# Patient Record
Sex: FEMALE | Race: WHITE | Employment: UNEMPLOYED | URBAN - METROPOLITAN AREA
[De-identification: names, ages, dates, MRNs, and addresses within clinical notes are randomized per-mention and may not be internally consistent; named-entity substitution may affect disease eponyms.]

---

## 2024-05-30 ENCOUNTER — HOSPITAL ENCOUNTER (OUTPATIENT)
Facility: HOSPITAL | Age: 24
Setting detail: OBSERVATION
Discharge: HOME | End: 2024-05-31
Attending: EMERGENCY MEDICINE | Admitting: INTERNAL MEDICINE
Payer: COMMERCIAL

## 2024-05-30 DIAGNOSIS — E11.65 HYPERGLYCEMIA DUE TO DIABETES MELLITUS (HCC): ICD-10-CM

## 2024-05-30 DIAGNOSIS — N18.4 ANEMIA DUE TO STAGE 4 CHRONIC KIDNEY DISEASE  (HCC): ICD-10-CM

## 2024-05-30 DIAGNOSIS — E87.20 METABOLIC ACIDOSIS: ICD-10-CM

## 2024-05-30 DIAGNOSIS — N18.9 ACUTE RENAL FAILURE SUPERIMPOSED ON CHRONIC KIDNEY DISEASE, UNSPECIFIED ACUTE RENAL FAILURE TYPE, UNSPECIFIED CKD STAGE  (HCC): ICD-10-CM

## 2024-05-30 DIAGNOSIS — N17.9 ACUTE RENAL FAILURE SUPERIMPOSED ON CHRONIC KIDNEY DISEASE, UNSPECIFIED ACUTE RENAL FAILURE TYPE, UNSPECIFIED CKD STAGE  (HCC): ICD-10-CM

## 2024-05-30 DIAGNOSIS — N25.81 SECONDARY HYPERPARATHYROIDISM OF RENAL ORIGIN (HCC): ICD-10-CM

## 2024-05-30 DIAGNOSIS — E10.65 HYPERGLYCEMIA DUE TO TYPE 1 DIABETES MELLITUS (HCC): Primary | ICD-10-CM

## 2024-05-30 DIAGNOSIS — D63.1 ANEMIA DUE TO STAGE 4 CHRONIC KIDNEY DISEASE  (HCC): ICD-10-CM

## 2024-05-30 DIAGNOSIS — N18.4 STAGE 4 CHRONIC KIDNEY DISEASE (HCC): ICD-10-CM

## 2024-05-30 PROBLEM — I10 HYPERTENSION: Status: ACTIVE | Noted: 2024-05-30

## 2024-05-30 LAB
ALBUMIN SERPL BCP-MCNC: 4.1 G/DL (ref 3.5–5)
ALBUMIN SERPL BCP-MCNC: 4.1 G/DL (ref 3.5–5)
ALP SERPL-CCNC: 143 U/L (ref 34–104)
ALP SERPL-CCNC: 143 U/L (ref 34–104)
ALT SERPL W P-5'-P-CCNC: 16 U/L (ref 7–52)
ALT SERPL W P-5'-P-CCNC: 16 U/L (ref 7–52)
ANION GAP SERPL CALCULATED.3IONS-SCNC: 10 MMOL/L (ref 4–13)
ANION GAP SERPL CALCULATED.3IONS-SCNC: 10 MMOL/L (ref 4–13)
AST SERPL W P-5'-P-CCNC: 16 U/L (ref 13–39)
AST SERPL W P-5'-P-CCNC: 16 U/L (ref 13–39)
ATRIAL RATE: 84 BPM
ATRIAL RATE: 84 BPM
B-OH-BUTYR SERPL-MCNC: <0.05 MMOL/L (ref 0.02–0.27)
B-OH-BUTYR SERPL-MCNC: <0.05 MMOL/L (ref 0.02–0.27)
BACTERIA UR QL AUTO: NORMAL /HPF
BACTERIA UR QL AUTO: NORMAL /HPF
BASE EX.OXY STD BLDV CALC-SCNC: 69.2 % (ref 60–80)
BASE EX.OXY STD BLDV CALC-SCNC: 69.2 % (ref 60–80)
BASE EXCESS BLDV CALC-SCNC: -8.1 MMOL/L
BASE EXCESS BLDV CALC-SCNC: -8.1 MMOL/L
BASOPHILS # BLD AUTO: 0.06 THOUSANDS/ÂΜL (ref 0–0.1)
BASOPHILS # BLD AUTO: 0.06 THOUSANDS/ÂΜL (ref 0–0.1)
BASOPHILS NFR BLD AUTO: 1 % (ref 0–1)
BASOPHILS NFR BLD AUTO: 1 % (ref 0–1)
BILIRUB SERPL-MCNC: 0.24 MG/DL (ref 0.2–1)
BILIRUB SERPL-MCNC: 0.24 MG/DL (ref 0.2–1)
BILIRUB UR QL STRIP: NEGATIVE
BILIRUB UR QL STRIP: NEGATIVE
BUN SERPL-MCNC: 59 MG/DL (ref 5–25)
BUN SERPL-MCNC: 59 MG/DL (ref 5–25)
CALCIUM SERPL-MCNC: 9.2 MG/DL (ref 8.4–10.2)
CALCIUM SERPL-MCNC: 9.2 MG/DL (ref 8.4–10.2)
CHLORIDE SERPL-SCNC: 101 MMOL/L (ref 96–108)
CHLORIDE SERPL-SCNC: 101 MMOL/L (ref 96–108)
CLARITY UR: CLEAR
CLARITY UR: CLEAR
CO2 SERPL-SCNC: 18 MMOL/L (ref 21–32)
CO2 SERPL-SCNC: 18 MMOL/L (ref 21–32)
COLOR UR: COLORLESS
COLOR UR: COLORLESS
CREAT SERPL-MCNC: 4.61 MG/DL (ref 0.6–1.3)
CREAT SERPL-MCNC: 4.61 MG/DL (ref 0.6–1.3)
EOSINOPHIL # BLD AUTO: 0.23 THOUSAND/ÂΜL (ref 0–0.61)
EOSINOPHIL # BLD AUTO: 0.23 THOUSAND/ÂΜL (ref 0–0.61)
EOSINOPHIL NFR BLD AUTO: 3 % (ref 0–6)
EOSINOPHIL NFR BLD AUTO: 3 % (ref 0–6)
ERYTHROCYTE [DISTWIDTH] IN BLOOD BY AUTOMATED COUNT: 12.1 % (ref 11.6–15.1)
ERYTHROCYTE [DISTWIDTH] IN BLOOD BY AUTOMATED COUNT: 12.1 % (ref 11.6–15.1)
EXT PREGNANCY TEST URINE: NEGATIVE
EXT PREGNANCY TEST URINE: NEGATIVE
EXT. CONTROL: NORMAL
EXT. CONTROL: NORMAL
GFR SERPL CREATININE-BSD FRML MDRD: 12 ML/MIN/1.73SQ M
GFR SERPL CREATININE-BSD FRML MDRD: 12 ML/MIN/1.73SQ M
GLUCOSE SERPL-MCNC: 291 MG/DL (ref 65–140)
GLUCOSE SERPL-MCNC: 291 MG/DL (ref 65–140)
GLUCOSE SERPL-MCNC: 343 MG/DL (ref 65–140)
GLUCOSE SERPL-MCNC: 343 MG/DL (ref 65–140)
GLUCOSE SERPL-MCNC: 355 MG/DL (ref 65–140)
GLUCOSE SERPL-MCNC: 355 MG/DL (ref 65–140)
GLUCOSE SERPL-MCNC: 384 MG/DL (ref 65–140)
GLUCOSE SERPL-MCNC: 384 MG/DL (ref 65–140)
GLUCOSE SERPL-MCNC: 448 MG/DL (ref 65–140)
GLUCOSE SERPL-MCNC: 448 MG/DL (ref 65–140)
GLUCOSE SERPL-MCNC: 464 MG/DL (ref 65–140)
GLUCOSE SERPL-MCNC: 464 MG/DL (ref 65–140)
GLUCOSE UR STRIP-MCNC: ABNORMAL MG/DL
GLUCOSE UR STRIP-MCNC: ABNORMAL MG/DL
HCO3 BLDV-SCNC: 17.3 MMOL/L (ref 24–30)
HCO3 BLDV-SCNC: 17.3 MMOL/L (ref 24–30)
HCT VFR BLD AUTO: 30.1 % (ref 34.8–46.1)
HCT VFR BLD AUTO: 30.1 % (ref 34.8–46.1)
HGB BLD-MCNC: 10.6 G/DL (ref 11.5–15.4)
HGB BLD-MCNC: 10.6 G/DL (ref 11.5–15.4)
HGB UR QL STRIP.AUTO: ABNORMAL
HGB UR QL STRIP.AUTO: ABNORMAL
IMM GRANULOCYTES # BLD AUTO: 0.04 THOUSAND/UL (ref 0–0.2)
IMM GRANULOCYTES # BLD AUTO: 0.04 THOUSAND/UL (ref 0–0.2)
IMM GRANULOCYTES NFR BLD AUTO: 1 % (ref 0–2)
IMM GRANULOCYTES NFR BLD AUTO: 1 % (ref 0–2)
KETONES UR STRIP-MCNC: NEGATIVE MG/DL
KETONES UR STRIP-MCNC: NEGATIVE MG/DL
LEUKOCYTE ESTERASE UR QL STRIP: ABNORMAL
LEUKOCYTE ESTERASE UR QL STRIP: ABNORMAL
LYMPHOCYTES # BLD AUTO: 1.74 THOUSANDS/ÂΜL (ref 0.6–4.47)
LYMPHOCYTES # BLD AUTO: 1.74 THOUSANDS/ÂΜL (ref 0.6–4.47)
LYMPHOCYTES NFR BLD AUTO: 21 % (ref 14–44)
LYMPHOCYTES NFR BLD AUTO: 21 % (ref 14–44)
MCH RBC QN AUTO: 29.7 PG (ref 26.8–34.3)
MCH RBC QN AUTO: 29.7 PG (ref 26.8–34.3)
MCHC RBC AUTO-ENTMCNC: 35.2 G/DL (ref 31.4–37.4)
MCHC RBC AUTO-ENTMCNC: 35.2 G/DL (ref 31.4–37.4)
MCV RBC AUTO: 84 FL (ref 82–98)
MCV RBC AUTO: 84 FL (ref 82–98)
MONOCYTES # BLD AUTO: 0.46 THOUSAND/ÂΜL (ref 0.17–1.22)
MONOCYTES # BLD AUTO: 0.46 THOUSAND/ÂΜL (ref 0.17–1.22)
MONOCYTES NFR BLD AUTO: 6 % (ref 4–12)
MONOCYTES NFR BLD AUTO: 6 % (ref 4–12)
NEUTROPHILS # BLD AUTO: 5.86 THOUSANDS/ÂΜL (ref 1.85–7.62)
NEUTROPHILS # BLD AUTO: 5.86 THOUSANDS/ÂΜL (ref 1.85–7.62)
NEUTS SEG NFR BLD AUTO: 68 % (ref 43–75)
NEUTS SEG NFR BLD AUTO: 68 % (ref 43–75)
NITRITE UR QL STRIP: NEGATIVE
NITRITE UR QL STRIP: NEGATIVE
NON-SQ EPI CELLS URNS QL MICRO: NORMAL /HPF
NON-SQ EPI CELLS URNS QL MICRO: NORMAL /HPF
NRBC BLD AUTO-RTO: 0 /100 WBCS
NRBC BLD AUTO-RTO: 0 /100 WBCS
O2 CT BLDV-SCNC: 10.8 ML/DL
O2 CT BLDV-SCNC: 10.8 ML/DL
P AXIS: 70 DEGREES
P AXIS: 70 DEGREES
PCO2 BLDV: 35 MM HG (ref 42–50)
PCO2 BLDV: 35 MM HG (ref 42–50)
PH BLDV: 7.31 [PH] (ref 7.3–7.4)
PH BLDV: 7.31 [PH] (ref 7.3–7.4)
PH UR STRIP.AUTO: 6 [PH]
PH UR STRIP.AUTO: 6 [PH]
PLATELET # BLD AUTO: 225 THOUSANDS/UL (ref 149–390)
PLATELET # BLD AUTO: 225 THOUSANDS/UL (ref 149–390)
PMV BLD AUTO: 11.1 FL (ref 8.9–12.7)
PMV BLD AUTO: 11.1 FL (ref 8.9–12.7)
PO2 BLDV: 34.5 MM HG (ref 35–45)
PO2 BLDV: 34.5 MM HG (ref 35–45)
POTASSIUM SERPL-SCNC: 4.1 MMOL/L (ref 3.5–5.3)
POTASSIUM SERPL-SCNC: 4.1 MMOL/L (ref 3.5–5.3)
PR INTERVAL: 142 MS
PR INTERVAL: 142 MS
PROT SERPL-MCNC: 8 G/DL (ref 6.4–8.4)
PROT SERPL-MCNC: 8 G/DL (ref 6.4–8.4)
PROT UR STRIP-MCNC: ABNORMAL MG/DL
PROT UR STRIP-MCNC: ABNORMAL MG/DL
QRS AXIS: 71 DEGREES
QRS AXIS: 71 DEGREES
QRSD INTERVAL: 70 MS
QRSD INTERVAL: 70 MS
QT INTERVAL: 370 MS
QT INTERVAL: 370 MS
QTC INTERVAL: 437 MS
QTC INTERVAL: 437 MS
RBC # BLD AUTO: 3.57 MILLION/UL (ref 3.81–5.12)
RBC # BLD AUTO: 3.57 MILLION/UL (ref 3.81–5.12)
RBC #/AREA URNS AUTO: NORMAL /HPF
RBC #/AREA URNS AUTO: NORMAL /HPF
SODIUM SERPL-SCNC: 129 MMOL/L (ref 135–147)
SODIUM SERPL-SCNC: 129 MMOL/L (ref 135–147)
SP GR UR STRIP.AUTO: 1.01 (ref 1–1.03)
SP GR UR STRIP.AUTO: 1.01 (ref 1–1.03)
T WAVE AXIS: 67 DEGREES
T WAVE AXIS: 67 DEGREES
UROBILINOGEN UR STRIP-ACNC: <2 MG/DL
UROBILINOGEN UR STRIP-ACNC: <2 MG/DL
VENTRICULAR RATE: 84 BPM
VENTRICULAR RATE: 84 BPM
WBC # BLD AUTO: 8.39 THOUSAND/UL (ref 4.31–10.16)
WBC # BLD AUTO: 8.39 THOUSAND/UL (ref 4.31–10.16)
WBC #/AREA URNS AUTO: NORMAL /HPF
WBC #/AREA URNS AUTO: NORMAL /HPF

## 2024-05-30 PROCEDURE — 82010 KETONE BODYS QUAN: CPT

## 2024-05-30 PROCEDURE — 82948 REAGENT STRIP/BLOOD GLUCOSE: CPT

## 2024-05-30 PROCEDURE — 93005 ELECTROCARDIOGRAM TRACING: CPT

## 2024-05-30 PROCEDURE — 36415 COLL VENOUS BLD VENIPUNCTURE: CPT

## 2024-05-30 PROCEDURE — 96365 THER/PROPH/DIAG IV INF INIT: CPT

## 2024-05-30 PROCEDURE — 85025 COMPLETE CBC W/AUTO DIFF WBC: CPT | Performed by: EMERGENCY MEDICINE

## 2024-05-30 PROCEDURE — 99223 1ST HOSP IP/OBS HIGH 75: CPT | Performed by: INTERNAL MEDICINE

## 2024-05-30 PROCEDURE — 81001 URINALYSIS AUTO W/SCOPE: CPT | Performed by: EMERGENCY MEDICINE

## 2024-05-30 PROCEDURE — 93010 ELECTROCARDIOGRAM REPORT: CPT | Performed by: INTERNAL MEDICINE

## 2024-05-30 PROCEDURE — 82805 BLOOD GASES W/O2 SATURATION: CPT | Performed by: EMERGENCY MEDICINE

## 2024-05-30 PROCEDURE — 80053 COMPREHEN METABOLIC PANEL: CPT | Performed by: EMERGENCY MEDICINE

## 2024-05-30 PROCEDURE — 99284 EMERGENCY DEPT VISIT MOD MDM: CPT

## 2024-05-30 PROCEDURE — 81025 URINE PREGNANCY TEST: CPT | Performed by: EMERGENCY MEDICINE

## 2024-05-30 PROCEDURE — 99285 EMERGENCY DEPT VISIT HI MDM: CPT | Performed by: EMERGENCY MEDICINE

## 2024-05-30 RX ORDER — CABERGOLINE 0.5 MG/1
0.25 TABLET ORAL 2 TIMES WEEKLY
COMMUNITY
End: 2024-05-30 | Stop reason: CLARIF

## 2024-05-30 RX ORDER — FERROUS GLUCONATE 324(38)MG
324 TABLET ORAL
Status: DISCONTINUED | OUTPATIENT
Start: 2024-05-31 | End: 2024-05-31 | Stop reason: HOSPADM

## 2024-05-30 RX ORDER — HEPARIN SODIUM 5000 [USP'U]/ML
5000 INJECTION, SOLUTION INTRAVENOUS; SUBCUTANEOUS EVERY 8 HOURS SCHEDULED
Status: DISCONTINUED | OUTPATIENT
Start: 2024-05-30 | End: 2024-05-31 | Stop reason: HOSPADM

## 2024-05-30 RX ORDER — AMLODIPINE BESYLATE 5 MG/1
5 TABLET ORAL DAILY
COMMUNITY

## 2024-05-30 RX ORDER — SODIUM BICARBONATE 650 MG/1
650 TABLET ORAL 2 TIMES DAILY
Status: DISCONTINUED | OUTPATIENT
Start: 2024-05-30 | End: 2024-05-31 | Stop reason: HOSPADM

## 2024-05-30 RX ORDER — INSULIN LISPRO 100 [IU]/ML
4 INJECTION, SOLUTION INTRAVENOUS; SUBCUTANEOUS
Status: DISCONTINUED | OUTPATIENT
Start: 2024-05-30 | End: 2024-05-31 | Stop reason: HOSPADM

## 2024-05-30 RX ORDER — SODIUM CHLORIDE, SODIUM GLUCONATE, SODIUM ACETATE, POTASSIUM CHLORIDE, MAGNESIUM CHLORIDE, SODIUM PHOSPHATE, DIBASIC, AND POTASSIUM PHOSPHATE .53; .5; .37; .037; .03; .012; .00082 G/100ML; G/100ML; G/100ML; G/100ML; G/100ML; G/100ML; G/100ML
100 INJECTION, SOLUTION INTRAVENOUS CONTINUOUS
Status: DISPENSED | OUTPATIENT
Start: 2024-05-30 | End: 2024-05-31

## 2024-05-30 RX ORDER — ACETAMINOPHEN 325 MG/1
975 TABLET ORAL EVERY 8 HOURS PRN
Status: DISCONTINUED | OUTPATIENT
Start: 2024-05-30 | End: 2024-05-31 | Stop reason: HOSPADM

## 2024-05-30 RX ORDER — AMLODIPINE BESYLATE 5 MG/1
5 TABLET ORAL DAILY
Status: DISCONTINUED | OUTPATIENT
Start: 2024-05-31 | End: 2024-05-31 | Stop reason: HOSPADM

## 2024-05-30 RX ORDER — CALCITRIOL 0.25 UG/1
0.25 CAPSULE, LIQUID FILLED ORAL 3 TIMES WEEKLY
Status: DISCONTINUED | OUTPATIENT
Start: 2024-05-31 | End: 2024-05-31 | Stop reason: HOSPADM

## 2024-05-30 RX ORDER — SODIUM BICARBONATE 650 MG/1
650 TABLET ORAL 2 TIMES DAILY
COMMUNITY

## 2024-05-30 RX ORDER — FERROUS GLUCONATE 324(38)MG
324 TABLET ORAL
COMMUNITY

## 2024-05-30 RX ORDER — INSULIN LISPRO 100 [IU]/ML
1-5 INJECTION, SOLUTION INTRAVENOUS; SUBCUTANEOUS
Status: DISCONTINUED | OUTPATIENT
Start: 2024-05-30 | End: 2024-05-31 | Stop reason: HOSPADM

## 2024-05-30 RX ORDER — SODIUM CHLORIDE, SODIUM GLUCONATE, SODIUM ACETATE, POTASSIUM CHLORIDE, MAGNESIUM CHLORIDE, SODIUM PHOSPHATE, DIBASIC, AND POTASSIUM PHOSPHATE .53; .5; .37; .037; .03; .012; .00082 G/100ML; G/100ML; G/100ML; G/100ML; G/100ML; G/100ML; G/100ML
1000 INJECTION, SOLUTION INTRAVENOUS ONCE
Status: COMPLETED | OUTPATIENT
Start: 2024-05-30 | End: 2024-05-30

## 2024-05-30 RX ADMIN — INSULIN LISPRO 4 UNITS: 100 INJECTION, SOLUTION INTRAVENOUS; SUBCUTANEOUS at 21:11

## 2024-05-30 RX ADMIN — SODIUM CHLORIDE, SODIUM GLUCONATE, SODIUM ACETATE, POTASSIUM CHLORIDE, MAGNESIUM CHLORIDE, SODIUM PHOSPHATE, DIBASIC, AND POTASSIUM PHOSPHATE 1000 ML: .53; .5; .37; .037; .03; .012; .00082 INJECTION, SOLUTION INTRAVENOUS at 16:13

## 2024-05-30 RX ADMIN — SODIUM CHLORIDE, SODIUM GLUCONATE, SODIUM ACETATE, POTASSIUM CHLORIDE, MAGNESIUM CHLORIDE, SODIUM PHOSPHATE, DIBASIC, AND POTASSIUM PHOSPHATE 100 ML/HR: .53; .5; .37; .037; .03; .012; .00082 INJECTION, SOLUTION INTRAVENOUS at 20:01

## 2024-05-30 RX ADMIN — INSULIN LISPRO 5 UNITS: 100 INJECTION, SOLUTION INTRAVENOUS; SUBCUTANEOUS at 22:29

## 2024-05-30 RX ADMIN — ACETAMINOPHEN 975 MG: 325 TABLET ORAL at 22:27

## 2024-05-30 RX ADMIN — HEPARIN SODIUM 5000 UNITS: 5000 INJECTION INTRAVENOUS; SUBCUTANEOUS at 22:31

## 2024-05-30 RX ADMIN — SODIUM BICARBONATE 650 MG: 650 TABLET ORAL at 20:01

## 2024-05-30 RX ADMIN — INSULIN HUMAN 8 UNITS: 100 INJECTION, SUSPENSION SUBCUTANEOUS at 22:28

## 2024-05-30 NOTE — ASSESSMENT & PLAN NOTE
Patient presents with fatigue, polyuria, and polydipsia  Patient reports compliance with home insulin regimen, however sugars are consistently in the 300s-400s at home  Last HbA1c 9.3 in April 2024  Glucose 576 on outpatient blood work yesterday, patient's nephrologist instructed patient to come the ER  Glucose 343 on admission  Corrected sodium WNL  Follows outpatient with endocrinologist Dr. Peck, as seen in April   S/p 1L bolus in the ED  Home insulin regimen: NPH insulin 4 units daily in the morning, regular insulin TID with meals, NPH insulin 6 units qhs    Plan:  NPH insulin: 6 units in the morning, 8 units qhs  Insulin lispro: 4 units TID with meals  Accuchecks 4x daily before meals and at bedtime  Follow up Beta hydroxybutyrate   IVF Isolyte 100cc/hr for 12 hours

## 2024-05-30 NOTE — ASSESSMENT & PLAN NOTE
Lab Results   Component Value Date    EGFR 12 05/30/2024    CREATININE 4.61 (H) 05/30/2024     Cr 3.9-4.6 in April 2024  Follows outpatient with nephrologist      Plan:  Monitor BMP  Nephrology consulted, appreciate recommendations

## 2024-05-30 NOTE — H&P
[facility]  H&P  Name: Ashok Song 23 y.o. female I MRN: [id number]  Unit/Bed#: W -01 I Date of Admission: 5/30/2024   Date of Service: 5/30/2024 I Hospital Day: 0      Assessment & Plan   * Hyperglycemia due to type 1 diabetes mellitus (HCC)  Assessment & Plan  Patient presents with fatigue, polyuria, and polydipsia  Patient reports compliance with home insulin regimen, however sugars are consistently in the 300s-400s at home  Last HbA1c 9.3 in April 2024  Glucose 576 on outpatient blood work yesterday, patient's nephrologist instructed patient to come the ER  Glucose 343 on admission  Corrected sodium WNL  Follows outpatient with endocrinologist Dr. Peck, as seen in April   S/p 1L bolus in the ED  Home insulin regimen: NPH insulin 4 units daily in the morning, regular insulin TID with meals, NPH insulin 6 units qhs    Plan:  NPH insulin: 6 units in the morning, 8 units qhs  Insulin lispro: 4 units TID with meals  Accuchecks 4x daily before meals and at bedtime  Follow up Beta hydroxybutyrate   IVF Isolyte 100cc/hr for 12 hours     Stage 4 chronic kidney disease (HCC)  Assessment & Plan  Lab Results   Component Value Date    EGFR 12 05/30/2024    CREATININE 4.61 (H) 05/30/2024     Cr 3.6-4.6 in April 2024  Follows outpatient with nephrologist      Plan:  Monitor BMP  Nephrology consulted, appreciate recommendations     Hypertension  Assessment & Plan  BP well-controlled  Most Recent Blood Pressure: 136/78    Plan:  Continue home Amlodipine 5 mg daily     Metabolic acidosis  Assessment & Plan  No anion gap  Possibly RTA in the setting of worsening renal function  On home sodium bicarb 650 mg BID    Plan:  Continue sodium bicarb 650 mg BID  Nephrology consulted, appreciate recommendations       Secondary hyperparathyroidism of renal origin (HCC) with Vitamin D deficiency  Assessment & Plan  Last  in April 2024  Vitamin D 25-hydroxy 21.5 on 5/29  Per nephrology  note, patient was to take Vitamin D 50,000 units weekly for 12 weeks followed by 5,000 units daily  Per patient, she has completed the 50,000 weekly dosing  Her nephrologist ordered Calcitriol 0.25mg 3x weekly, to be started outpatient on 5/31    Plan:  Calcitriol 0.25mg 3x weekly  Vitamin D 5,000 units daily  Nephrology consulted, appreciate recommendations     Anemia due to stage 4 chronic kidney disease  (HCC)  Assessment & Plan  Recent Labs     05/30/24  1541   HGB 10.6*   MCV 84     Plan:  Continue home ferrous gluconate 324 mg daily   Monitor CBC  Nephrology consulted, appreciate recommendations            VTE Pharmacologic Prophylaxis: VTE Score: 3 Moderate Risk (Score 3-4) - Pharmacological DVT Prophylaxis Ordered: heparin.  Code Status: Level 1 - Full Code   Discussion with family: Updated  (sister) at bedside.    Anticipated Length of Stay: Patient will be admitted on an observation basis with an anticipated length of stay of less than 2 midnights secondary to hyperglycemia.    Chief Complaint: Hyperglycemia     History of Present Illness:  History was obtained using [employer] French Interpretor [id number]    Ashok Song is a 23 y.o. female with a PMH of Type 1 diabetes, CKD IV, and hypertension who presents with persistent hyperglycemia at home, associated with fatigue, polyuria, and polydipsia. She says that sugars are always in the 300s-400s at home, despite compliance with her insulin regimen. She says that her sugars are always this high, and she always feels tired. On outpatient blood work yesterday, glucose was 576 and she was advised by her nephrologist to seek care in the ED. In the ED, vitals were stable and glucose was 343. There was no anion gap, and there was no concern for DKA. Cr was 4.6. On outpatient labs in April, Cr ranged from 3.9 to 4.6. Na was 129, which is within normal limits when corrected for glucose. The patient will be admitted under observation for hyperglycemia.      Review of Systems:  Review of Systems   Constitutional:  Positive for fatigue. Negative for chills and fever.   HENT:  Negative for sneezing.    Eyes:  Negative for visual disturbance.   Respiratory:  Negative for shortness of breath.    Cardiovascular:  Negative for chest pain.   Gastrointestinal:  Positive for nausea. Negative for abdominal pain and vomiting.   Endocrine: Positive for polydipsia and polyuria.   Genitourinary:  Negative for dysuria.   Musculoskeletal:  Negative for gait problem.   Neurological:  Positive for numbness and headaches. Negative for dizziness.   All other systems reviewed and are negative.      Past Medical and Surgical History:   Past Medical History:   Diagnosis Date    Diabetes (HCC)        No past surgical history on file.    Meds/Allergies:  Prior to Admission medications    Not on File     I have reviewed home medications with patient personally.    Allergies: Not on File    Social History:  Marital Status: Single   Occupation: Not obtained  Patient Pre-hospital Living Situation: Home  Patient Pre-hospital Level of Mobility: walks  Patient Pre-hospital Diet Restrictions:   Substance Use History:   Social History     Substance and Sexual Activity   Alcohol Use Not on file     Social History     Tobacco Use   Smoking Status Not on file   Smokeless Tobacco Not on file     Social History     Substance and Sexual Activity   Drug Use Not on file       Family History:  No family history on file.    Physical Exam:     Vitals:   Blood Pressure: 139/89 (05/30/24 1854)  Pulse: 72 (05/30/24 1854)  Temperature: 98.4 °F (36.9 °C) (05/30/24 1854)  Temp Source: Oral (05/30/24 1529)  Respirations: 18 (05/30/24 1854)  Weight - Scale: 61.5 kg (135 lb 9.3 oz) (05/30/24 1824)  SpO2: 100 % (05/30/24 1812)    Physical Exam  Vitals and nursing note reviewed.   Constitutional:       General: She is not in acute distress.     Appearance: She is not ill-appearing.   HENT:      Head: Normocephalic and  atraumatic.      Right Ear: External ear normal.      Left Ear: External ear normal.      Nose: Nose normal.      Mouth/Throat:      Mouth: Mucous membranes are moist.      Pharynx: Oropharynx is clear.   Eyes:      General: No scleral icterus.  Cardiovascular:      Rate and Rhythm: Normal rate and regular rhythm.   Pulmonary:      Effort: Pulmonary effort is normal.      Breath sounds: Normal breath sounds.   Abdominal:      General: Bowel sounds are normal. There is no distension.      Palpations: There is no mass.      Tenderness: There is no abdominal tenderness. There is no guarding.   Musculoskeletal:      Cervical back: Neck supple.      Right lower leg: No edema.      Left lower leg: No edema.   Skin:     General: Skin is warm and dry.   Neurological:      General: No focal deficit present.      Mental Status: She is alert and oriented to person, place, and time. Mental status is at baseline.      Sensory: No sensory deficit.   Psychiatric:         Mood and Affect: Mood normal.        Additional Data:     Lab Results:  Results from last 7 days   Lab Units 05/30/24  1541   WBC Thousand/uL 8.39   HEMOGLOBIN g/dL 10.6*   HEMATOCRIT % 30.1*   PLATELETS Thousands/uL 225   SEGS PCT % 68   LYMPHO PCT % 21   MONO PCT % 6   EOS PCT % 3     Results from last 7 days   Lab Units 05/30/24  1541   SODIUM mmol/L 129*   POTASSIUM mmol/L 4.1   CHLORIDE mmol/L 101   CO2 mmol/L 18*   BUN mg/dL 59*   CREATININE mg/dL 4.61*   ANION GAP mmol/L 10   CALCIUM mg/dL 9.2   ALBUMIN g/dL 4.1   TOTAL BILIRUBIN mg/dL 0.24   ALK PHOS U/L 143*   ALT U/L 16   AST U/L 16   GLUCOSE RANDOM mg/dL 343*         Results from last 7 days   Lab Units 05/30/24  1536   POC GLUCOSE mg/dl 355*               Lines/Drains:  Invasive Devices       Peripheral Intravenous Line  Duration             Peripheral IV 05/30/24 Right Antecubital <1 day                        Imaging: None obtained  No orders to display       EKG and Other Studies Reviewed on  Admission:   EKG: NSR. HR 84.    ** Please Note: This note has been constructed using a voice recognition system. **

## 2024-05-30 NOTE — ED PROVIDER NOTES
History  Chief Complaint   Patient presents with    Evaluation of Abnormal Diagnostic Test     Pt states she had lab work done yesterday and to come to the ER for high blood sugar. Also states some tingling in both feet. Pt states weakness as well.         23-year-old female, history of type 1 diabetes, blood sugars have been high at home, here today they are 355.  Main complaint is generalized weakness, says she has very poor energy has difficult time performing ADLs, has had to decrease her activity recently due to just feeling tired all the time.  Denies URI symptoms, no chest pain no shortness of breath, no abdominal pain, no dysuria.  But feels increasingly weak, tired, polyuria polydipsia.,  Controlled at home with insulin, has been compliant with it, taking it regularly.  Discussed with her nephrologist to send to the hospital due to elevated blood sugars.      Evaluation of Abnormal Diagnostic Test      None       Past Medical History:   Diagnosis Date    Diabetes (HCC)        No past surgical history on file.    No family history on file.  I have reviewed and agree with the history as documented.    E-Cigarette/Vaping     E-Cigarette/Vaping Substances          Review of Systems   Constitutional:  Negative for activity change, chills, diaphoresis and fever.   HENT:  Negative for congestion, sinus pressure and sore throat.    Eyes:  Negative for pain and visual disturbance.   Respiratory:  Negative for cough, chest tightness, shortness of breath, wheezing and stridor.    Cardiovascular:  Negative for chest pain and palpitations.   Gastrointestinal:  Negative for abdominal distention, abdominal pain, constipation, diarrhea, nausea and vomiting.   Endocrine: Positive for polydipsia and polyuria.   Genitourinary:  Negative for dysuria and frequency.   Musculoskeletal:  Negative for neck pain and neck stiffness.   Skin:  Negative for rash.   Neurological:  Negative for dizziness, speech difficulty,  light-headedness, numbness and headaches.       Physical Exam  Physical Exam  Vitals reviewed.   Constitutional:       Appearance: She is well-developed. She is not diaphoretic.   HENT:      Head: Normocephalic and atraumatic.      Right Ear: External ear normal.      Left Ear: External ear normal.      Nose: Nose normal.   Eyes:      General:         Right eye: No discharge.         Left eye: No discharge.      Pupils: Pupils are equal, round, and reactive to light.   Neck:      Trachea: No tracheal deviation.   Cardiovascular:      Rate and Rhythm: Normal rate and regular rhythm.      Heart sounds: Normal heart sounds. No murmur heard.  Pulmonary:      Effort: Pulmonary effort is normal. No respiratory distress.      Breath sounds: Normal breath sounds. No stridor.   Abdominal:      General: There is no distension.      Palpations: Abdomen is soft.      Tenderness: There is no abdominal tenderness. There is no guarding or rebound.   Musculoskeletal:         General: Normal range of motion.      Cervical back: Normal range of motion and neck supple.   Skin:     General: Skin is warm and dry.      Coloration: Skin is not pale.      Findings: No erythema.   Neurological:      General: No focal deficit present.      Mental Status: She is alert and oriented to person, place, and time.         Vital Signs  ED Triage Vitals [05/30/24 1529]   Temperature Pulse Respirations Blood Pressure SpO2   98.2 °F (36.8 °C) 92 18 136/94 100 %      Temp Source Heart Rate Source Patient Position - Orthostatic VS BP Location FiO2 (%)   Oral Monitor Sitting Left arm --      Pain Score       --           Vitals:    05/30/24 1529   BP: 136/94   Pulse: 92   Patient Position - Orthostatic VS: Sitting         Visual Acuity      ED Medications  Medications   multi-electrolyte (ISOLYTE-S PH 7.4) bolus 1,000 mL (1,000 mL Intravenous New Bag 5/30/24 1613)       Diagnostic Studies  Results Reviewed       Procedure Component Value Units Date/Time     Urine Microscopic [[id number]]  (Normal) Collected: 05/30/24 1613    Lab Status: Final result Specimen: Urine, Clean Catch Updated: 05/30/24 1659     RBC, UA 1-2 /hpf      WBC, UA 1-2 /hpf      Epithelial Cells Occasional /hpf      Bacteria, UA Occasional /hpf     UA w Reflex to Microscopic w Reflex to Culture [[id number]]  (Abnormal) Collected: 05/30/24 1613    Lab Status: Final result Specimen: Urine, Clean Catch Updated: 05/30/24 1624     Color, UA Colorless     Clarity, UA Clear     Specific Gravity, UA 1.007     pH, UA 6.0     Leukocytes, UA Elevated glucose may cause decreased leukocyte values. See urine microscopic for UWBC result     Nitrite, UA Negative     Protein,  (2+) mg/dl      Glucose, UA >=1000 (1%) mg/dl      Ketones, UA Negative mg/dl      Urobilinogen, UA <2.0 mg/dl      Bilirubin, UA Negative     Occult Blood, UA Large    Blood gas, venous [[id number]]  (Abnormal) Collected: 05/30/24 1615    Lab Status: Final result Specimen: Blood from Arm, Right Updated: 05/30/24 1620     pH, Jem 7.311     pCO2, Jem 35.0 mm Hg      pO2, Jem 34.5 mm Hg      HCO3, Jem 17.3 mmol/L      Base Excess, Jem -8.1 mmol/L      O2 Content, Jem 10.8 ml/dL      O2 HGB, VENOUS 69.2 %     Comprehensive metabolic panel [[id number]]  (Abnormal) Collected: 05/30/24 1541    Lab Status: Final result Specimen: Blood from Arm, Left Updated: 05/30/24 1617     Sodium 129 mmol/L      Potassium 4.1 mmol/L      Chloride 101 mmol/L      CO2 18 mmol/L      ANION GAP 10 mmol/L      BUN 59 mg/dL      Creatinine 4.61 mg/dL      Glucose 343 mg/dL      Calcium 9.2 mg/dL      AST 16 U/L      ALT 16 U/L      Alkaline Phosphatase 143 U/L      Total Protein 8.0 g/dL      Albumin 4.1 g/dL      Total Bilirubin 0.24 mg/dL      eGFR 12 ml/min/1.73sq m     Narrative:      National Kid[employer] guidelines for Chronic Kidney Disease (CKD):     Stage 1 with normal or high GFR (GFR > 90 mL/min/1.73 square meters)    Stage 2 Mild CKD (GFR =  60-89 mL/min/1.73 square meters)    Stage 3A Moderate CKD (GFR = 45-59 mL/min/1.73 square meters)    Stage 3B Moderate CKD (GFR = 30-44 mL/min/1.73 square meters)    Stage 4 Severe CKD (GFR = 15-29 mL/min/1.73 square meters)    Stage 5 End Stage CKD (GFR <15 mL/min/1.73 square meters)  Note: GFR calculation is accurate only with a steady state creatinine    POCT pregnancy, urine [[id number]]  (Normal) Resulted: 05/30/24 1616    Lab Status: Final result Updated: 05/30/24 1616     EXT Preg Test, Ur Negative     Control Valid    CBC and differential [[id number]]  (Abnormal) Collected: 05/30/24 1541    Lab Status: Final result Specimen: Blood from Arm, Left Updated: 05/30/24 1557     WBC 8.39 Thousand/uL      RBC 3.57 Million/uL      Hemoglobin 10.6 g/dL      Hematocrit 30.1 %      MCV 84 fL      MCH 29.7 pg      MCHC 35.2 g/dL      RDW 12.1 %      MPV 11.1 fL      Platelets 225 Thousands/uL      nRBC 0 /100 WBCs      Segmented % 68 %      Immature Grans % 1 %      Lymphocytes % 21 %      Monocytes % 6 %      Eosinophils Relative 3 %      Basophils Relative 1 %      Absolute Neutrophils 5.86 Thousands/µL      Absolute Immature Grans 0.04 Thousand/uL      Absolute Lymphocytes 1.74 Thousands/µL      Absolute Monocytes 0.46 Thousand/µL      Eosinophils Absolute 0.23 Thousand/µL      Basophils Absolute 0.06 Thousands/µL     Fingerstick Glucose (POCT) [[id number]]  (Abnormal) Collected: 05/30/24 1536    Lab Status: Final result Specimen: Blood Updated: 05/30/24 1537     POC Glucose 355 mg/dl                    No orders to display              Procedures  Procedures         ED Course                               SBIRT 22yo+      Flowsheet Row Most Recent Value   Initial Alcohol Screen: US AUDIT-C     1. How often do you have a drink containing alcohol? 0 Filed at: 05/30/2024 1545   2. How many drinks containing alcohol do you have on a typical day you are drinking?  0 Filed at: 05/30/2024 1545   3a. Male UNDER 65: How often do  you have five or more drinks on one occasion? 0 Filed at: 05/30/2024 1545   3b. FEMALE Any Age, or MALE 65+: How often do you have 4 or more drinks on one occassion? 0 Filed at: 05/30/2024 1545   Audit-C Score 0 Filed at: 05/30/2024 1540   TYLER: How many times in the past year have you...    Used an illegal drug or used a prescription medication for non-medical reasons? Never Filed at: 05/30/2024 1545                      Medical Decision Making        Initial ED assessment:    23-year-old female generalized weakness type 1 diabetes poorly controlled sugars rising creatinine    Initial DDx includes but is not limited to:   Likely poorly controlled diabetes from insulin as opposed to medication noncompliance or diet related as she denies these being factors.    Initial ED plan:    IV fluid, vbg      Final ED summary/disposition:   After evaluation and workup in the emergency department, admitted to internal medicine service as patient's weakness is making her having a hard time doing ADLs, progressively worsening kidney function and diabetes control in such a young patient    Amount and/or Complexity of Data Reviewed  Labs: ordered.    Risk  Prescription drug management.  Decision regarding hospitalization.             Disposition  Final diagnoses:   Hyperglycemia due to diabetes mellitus (HCC)   Hyperglycemia due to type 1 diabetes mellitus (HCC)   Acute renal failure superimposed on chronic kidney disease, unspecified acute renal failure type, unspecified CKD stage  (HCC)     Time reflects when diagnosis was documented in both MDM as applicable and the Disposition within this note       Time User Action Codes Description Comment    5/30/2024  5:12 PM Justin Larkin Add [E11.65] Hyperglycemia due to diabetes mellitus (HCC)     5/30/2024  5:12 PM Justin Larkin Add [E10.65] Hyperglycemia due to type 1 diabetes mellitus (HCC)     5/30/2024  5:12 PM Justin Larkin Modify [E11.65] Hyperglycemia due to diabetes  mellitus (HCC)     5/30/2024  5:12 PM Justin Larkin Modify [E10.65] Hyperglycemia due to type 1 diabetes mellitus (HCC)     5/30/2024  5:12 PM Justin Larkin Add [N17.9,  N18.9] Acute renal failure superimposed on chronic kidney disease, unspecified acute renal failure type, unspecified CKD stage  (HCC)           ED Disposition       ED Disposition   Admit    Condition   Stable    Date/Time   Thu May 30, 2024 0838    Comment   Case was discussed with Dr. garcia and the patient's admission status was agreed to be Admission Status: observation status to the service of Dr. garcia .               Follow-up Information    None         Patient's Medications    No medications on file       No discharge procedures on file.    PDMP Review       None            ED Provider  Electronically Signed by             Justin Larkin DO  05/30/24 3640

## 2024-05-30 NOTE — ASSESSMENT & PLAN NOTE
No anion gap  Possibly RTA in the setting of worsening renal function  On home sodium bicarb 650 mg BID    Plan:  Continue sodium bicarb 650 mg BID  Nephrology consulted, appreciate recommendations

## 2024-05-30 NOTE — ASSESSMENT & PLAN NOTE
Recent Labs     05/30/24  1541   HGB 10.6*   MCV 84     Plan:  Continue home ferrous gluconate 324 mg daily   Monitor CBC  Nephrology consulted, appreciate recommendations

## 2024-05-30 NOTE — ASSESSMENT & PLAN NOTE
BP well-controlled  Most Recent Blood Pressure: 136/78    Plan:  Continue home Amlodipine 5 mg daily

## 2024-05-30 NOTE — ASSESSMENT & PLAN NOTE
Last  in April 2024  Vitamin D 25-hydroxy 21.5 on 5/29  Per nephrology note, patient was to take Vitamin D 50,000 units weekly for 12 weeks followed by 5,000 units daily  Per patient, she has completed the 50,000 weekly dosing  Her nephrologist ordered Calcitriol 0.25mg 3x weekly, to be started outpatient on 5/31    Plan:  Calcitriol 0.25mg 3x weekly  Vitamin D 5,000 units daily  Nephrology consulted, appreciate recommendations

## 2024-05-31 VITALS
SYSTOLIC BLOOD PRESSURE: 122 MMHG | TEMPERATURE: 98.4 F | RESPIRATION RATE: 14 BRPM | OXYGEN SATURATION: 99 % | OXYGEN SATURATION: 99 % | WEIGHT: 135.58 LBS | TEMPERATURE: 98.4 F | WEIGHT: 135.58 LBS | RESPIRATION RATE: 14 BRPM | SYSTOLIC BLOOD PRESSURE: 122 MMHG | HEART RATE: 79 BPM | DIASTOLIC BLOOD PRESSURE: 82 MMHG | HEART RATE: 79 BPM | DIASTOLIC BLOOD PRESSURE: 82 MMHG

## 2024-05-31 LAB
ANION GAP SERPL CALCULATED.3IONS-SCNC: 9 MMOL/L (ref 4–13)
ANION GAP SERPL CALCULATED.3IONS-SCNC: 9 MMOL/L (ref 4–13)
BASOPHILS # BLD AUTO: 0.04 THOUSANDS/ÂΜL (ref 0–0.1)
BASOPHILS # BLD AUTO: 0.04 THOUSANDS/ÂΜL (ref 0–0.1)
BASOPHILS NFR BLD AUTO: 1 % (ref 0–1)
BASOPHILS NFR BLD AUTO: 1 % (ref 0–1)
BUN SERPL-MCNC: 54 MG/DL (ref 5–25)
BUN SERPL-MCNC: 54 MG/DL (ref 5–25)
CALCIUM SERPL-MCNC: 8.4 MG/DL (ref 8.4–10.2)
CALCIUM SERPL-MCNC: 8.4 MG/DL (ref 8.4–10.2)
CHLORIDE SERPL-SCNC: 108 MMOL/L (ref 96–108)
CHLORIDE SERPL-SCNC: 108 MMOL/L (ref 96–108)
CO2 SERPL-SCNC: 21 MMOL/L (ref 21–32)
CO2 SERPL-SCNC: 21 MMOL/L (ref 21–32)
CREAT SERPL-MCNC: 4.36 MG/DL (ref 0.6–1.3)
CREAT SERPL-MCNC: 4.36 MG/DL (ref 0.6–1.3)
EOSINOPHIL # BLD AUTO: 0.24 THOUSAND/ÂΜL (ref 0–0.61)
EOSINOPHIL # BLD AUTO: 0.24 THOUSAND/ÂΜL (ref 0–0.61)
EOSINOPHIL NFR BLD AUTO: 5 % (ref 0–6)
EOSINOPHIL NFR BLD AUTO: 5 % (ref 0–6)
ERYTHROCYTE [DISTWIDTH] IN BLOOD BY AUTOMATED COUNT: 12.6 % (ref 11.6–15.1)
ERYTHROCYTE [DISTWIDTH] IN BLOOD BY AUTOMATED COUNT: 12.6 % (ref 11.6–15.1)
GFR SERPL CREATININE-BSD FRML MDRD: 13 ML/MIN/1.73SQ M
GFR SERPL CREATININE-BSD FRML MDRD: 13 ML/MIN/1.73SQ M
GLUCOSE SERPL-MCNC: 115 MG/DL (ref 65–140)
GLUCOSE SERPL-MCNC: 115 MG/DL (ref 65–140)
GLUCOSE SERPL-MCNC: 175 MG/DL (ref 65–140)
GLUCOSE SERPL-MCNC: 175 MG/DL (ref 65–140)
GLUCOSE SERPL-MCNC: 81 MG/DL (ref 65–140)
GLUCOSE SERPL-MCNC: 81 MG/DL (ref 65–140)
GLUCOSE SERPL-MCNC: 82 MG/DL (ref 65–140)
GLUCOSE SERPL-MCNC: 82 MG/DL (ref 65–140)
HCT VFR BLD AUTO: 24.8 % (ref 34.8–46.1)
HCT VFR BLD AUTO: 24.8 % (ref 34.8–46.1)
HGB BLD-MCNC: 8.5 G/DL (ref 11.5–15.4)
HGB BLD-MCNC: 8.5 G/DL (ref 11.5–15.4)
IMM GRANULOCYTES # BLD AUTO: 0.02 THOUSAND/UL (ref 0–0.2)
IMM GRANULOCYTES # BLD AUTO: 0.02 THOUSAND/UL (ref 0–0.2)
IMM GRANULOCYTES NFR BLD AUTO: 0 % (ref 0–2)
IMM GRANULOCYTES NFR BLD AUTO: 0 % (ref 0–2)
LYMPHOCYTES # BLD AUTO: 1.82 THOUSANDS/ÂΜL (ref 0.6–4.47)
LYMPHOCYTES # BLD AUTO: 1.82 THOUSANDS/ÂΜL (ref 0.6–4.47)
LYMPHOCYTES NFR BLD AUTO: 36 % (ref 14–44)
LYMPHOCYTES NFR BLD AUTO: 36 % (ref 14–44)
MCH RBC QN AUTO: 29.5 PG (ref 26.8–34.3)
MCH RBC QN AUTO: 29.5 PG (ref 26.8–34.3)
MCHC RBC AUTO-ENTMCNC: 34.3 G/DL (ref 31.4–37.4)
MCHC RBC AUTO-ENTMCNC: 34.3 G/DL (ref 31.4–37.4)
MCV RBC AUTO: 86 FL (ref 82–98)
MCV RBC AUTO: 86 FL (ref 82–98)
MONOCYTES # BLD AUTO: 0.44 THOUSAND/ÂΜL (ref 0.17–1.22)
MONOCYTES # BLD AUTO: 0.44 THOUSAND/ÂΜL (ref 0.17–1.22)
MONOCYTES NFR BLD AUTO: 9 % (ref 4–12)
MONOCYTES NFR BLD AUTO: 9 % (ref 4–12)
NEUTROPHILS # BLD AUTO: 2.55 THOUSANDS/ÂΜL (ref 1.85–7.62)
NEUTROPHILS # BLD AUTO: 2.55 THOUSANDS/ÂΜL (ref 1.85–7.62)
NEUTS SEG NFR BLD AUTO: 49 % (ref 43–75)
NEUTS SEG NFR BLD AUTO: 49 % (ref 43–75)
NRBC BLD AUTO-RTO: 0 /100 WBCS
NRBC BLD AUTO-RTO: 0 /100 WBCS
PLATELET # BLD AUTO: 188 THOUSANDS/UL (ref 149–390)
PLATELET # BLD AUTO: 188 THOUSANDS/UL (ref 149–390)
PMV BLD AUTO: 11.6 FL (ref 8.9–12.7)
PMV BLD AUTO: 11.6 FL (ref 8.9–12.7)
POTASSIUM SERPL-SCNC: 3.7 MMOL/L (ref 3.5–5.3)
POTASSIUM SERPL-SCNC: 3.7 MMOL/L (ref 3.5–5.3)
RBC # BLD AUTO: 2.88 MILLION/UL (ref 3.81–5.12)
RBC # BLD AUTO: 2.88 MILLION/UL (ref 3.81–5.12)
SODIUM SERPL-SCNC: 138 MMOL/L (ref 135–147)
SODIUM SERPL-SCNC: 138 MMOL/L (ref 135–147)
WBC # BLD AUTO: 5.11 THOUSAND/UL (ref 4.31–10.16)
WBC # BLD AUTO: 5.11 THOUSAND/UL (ref 4.31–10.16)

## 2024-05-31 PROCEDURE — 82948 REAGENT STRIP/BLOOD GLUCOSE: CPT

## 2024-05-31 PROCEDURE — 85025 COMPLETE CBC W/AUTO DIFF WBC: CPT

## 2024-05-31 PROCEDURE — 99239 HOSP IP/OBS DSCHRG MGMT >30: CPT | Performed by: HOSPITALIST

## 2024-05-31 PROCEDURE — 80048 BASIC METABOLIC PNL TOTAL CA: CPT

## 2024-05-31 PROCEDURE — 99245 OFF/OP CONSLTJ NEW/EST HI 55: CPT | Performed by: INTERNAL MEDICINE

## 2024-05-31 RX ORDER — CALCITRIOL 0.25 UG/1
0.25 CAPSULE, LIQUID FILLED ORAL 3 TIMES WEEKLY
Refills: 0 | Status: CANCELLED | OUTPATIENT
Start: 2024-06-03

## 2024-05-31 RX ORDER — SODIUM CHLORIDE, SODIUM GLUCONATE, SODIUM ACETATE, POTASSIUM CHLORIDE, MAGNESIUM CHLORIDE, SODIUM PHOSPHATE, DIBASIC, AND POTASSIUM PHOSPHATE .53; .5; .37; .037; .03; .012; .00082 G/100ML; G/100ML; G/100ML; G/100ML; G/100ML; G/100ML; G/100ML
75 INJECTION, SOLUTION INTRAVENOUS CONTINUOUS
Status: DISCONTINUED | OUTPATIENT
Start: 2024-05-31 | End: 2024-05-31 | Stop reason: HOSPADM

## 2024-05-31 RX ORDER — CALCITRIOL 0.25 UG/1
0.25 CAPSULE, LIQUID FILLED ORAL 3 TIMES WEEKLY
COMMUNITY

## 2024-05-31 RX ADMIN — SODIUM BICARBONATE 650 MG: 650 TABLET ORAL at 09:16

## 2024-05-31 RX ADMIN — HEPARIN SODIUM 5000 UNITS: 5000 INJECTION INTRAVENOUS; SUBCUTANEOUS at 13:23

## 2024-05-31 RX ADMIN — FERROUS GLUCONATE 324 MG: 324 TABLET ORAL at 09:17

## 2024-05-31 RX ADMIN — INSULIN LISPRO 4 UNITS: 100 INJECTION, SOLUTION INTRAVENOUS; SUBCUTANEOUS at 13:23

## 2024-05-31 RX ADMIN — EPOETIN ALFA 10000 UNITS: 10000 SOLUTION INTRAVENOUS; SUBCUTANEOUS at 13:23

## 2024-05-31 RX ADMIN — Medication 5000 UNITS: at 09:16

## 2024-05-31 RX ADMIN — HEPARIN SODIUM 5000 UNITS: 5000 INJECTION INTRAVENOUS; SUBCUTANEOUS at 06:22

## 2024-05-31 RX ADMIN — CALCITRIOL 0.25 MCG: 0.25 CAPSULE, LIQUID FILLED ORAL at 09:17

## 2024-05-31 RX ADMIN — SODIUM CHLORIDE, SODIUM GLUCONATE, SODIUM ACETATE, POTASSIUM CHLORIDE, MAGNESIUM CHLORIDE, SODIUM PHOSPHATE, DIBASIC, AND POTASSIUM PHOSPHATE 75 ML/HR: .53; .5; .37; .037; .03; .012; .00082 INJECTION, SOLUTION INTRAVENOUS at 11:06

## 2024-05-31 RX ADMIN — INSULIN HUMAN 6 UNITS: 100 INJECTION, SUSPENSION SUBCUTANEOUS at 09:18

## 2024-05-31 RX ADMIN — AMLODIPINE BESYLATE 5 MG: 5 TABLET ORAL at 09:16

## 2024-05-31 RX ADMIN — INSULIN LISPRO 4 UNITS: 100 INJECTION, SOLUTION INTRAVENOUS; SUBCUTANEOUS at 09:17

## 2024-05-31 RX ADMIN — SODIUM CHLORIDE, SODIUM GLUCONATE, SODIUM ACETATE, POTASSIUM CHLORIDE, MAGNESIUM CHLORIDE, SODIUM PHOSPHATE, DIBASIC, AND POTASSIUM PHOSPHATE 100 ML/HR: .53; .5; .37; .037; .03; .012; .00082 INJECTION, SOLUTION INTRAVENOUS at 06:24

## 2024-05-31 NOTE — PLAN OF CARE
Problem: PAIN - ADULT  Goal: Verbalizes/displays adequate comfort level or baseline comfort level  Description: Interventions:  - Encourage patient to monitor pain and request assistance  - Assess pain using appropriate pain scale  - Administer analgesics based on type and severity of pain and evaluate response  - Implement non-pharmacological measures as appropriate and evaluate response  - Consider cultural and social influences on pain and pain management  - Notify physician/advanced practitioner if interventions unsuccessful or patient reports new pain  Outcome: Progressing     Problem: INFECTION - ADULT  Goal: Absence or prevention of progression during hospitalization  Description: INTERVENTIONS:  - Assess and monitor for signs and symptoms of infection  - Monitor lab/diagnostic results  - Monitor all insertion sites, i.e. indwelling lines, tubes, and drains  - Monitor endotracheal if appropriate and nasal secretions for changes in amount and color  - Dob[place] appropriate cooling/warming therapies per order  - Administer medications as ordered  - Instruct and encourage patient and family to use good hand hygiene technique  - Identify and instruct in appropriate isolation precautions for identified infection/condition  Outcome: Progressing  Goal: Absence of fever/infection during neutropenic period  Description: INTERVENTIONS:  - Monitor WBC    Outcome: Progressing     Problem: SAFETY ADULT  Goal: Patient will remain free of falls  Description: INTERVENTIONS:  - Educate patient/family on patient safety including physical limitations  - Instruct patient to call for assistance with activity   - Consult OT/PT to assist with strengthening/mobility   - Keep Call bell within reach  - Keep bed low and locked with side rails adjusted as appropriate  - Keep care items and personal belongings within reach  - Initiate and maintain comfort rounds  - Make Fall Risk Sign visible to staff  - Apply yellow socks and bracelet  for high fall risk patients  - Consider moving patient to room near nurses station  Outcome: Progressing  Goal: Maintain or return to baseline ADL function  Description: INTERVENTIONS:  -  Assess patient's ability to carry out ADLs; assess patient's baseline for ADL function and identify physical deficits which impact ability to perform ADLs (bathing, care of mouth/teeth, toileting, grooming, dressing, etc.)  - Assess/evaluate cause of self-care deficits   - Assess range of motion  - Assess patient's mobility; develop plan if impaired  - Assess patient's need for assistive devices and provide as appropriate  - Encourage maximum independence but intervene and supervise when necessary  - Involve family in performance of ADLs  - Assess for home care needs following discharge   - Consider OT consult to assist with ADL evaluation and planning for discharge  - Provide patient education as appropriate  Outcome: Progressing  Goal: Maintains/Returns to pre admission functional level  Description: INTERVENTIONS:  - Perform AM-PAC 6 Click Basic Mobility/ Daily Activity assessment daily.  - Set and communicate daily mobility goal to care team and patient/family/caregiver.   - Collaborate with rehabilitation services on mobility goals if consulted  - Out of bed for toileting  - Record patient progress and toleration of activity level   Outcome: Progressing     Problem: DISCHARGE PLANNING  Goal: Discharge to home or other facility with appropriate resources  Description: INTERVENTIONS:  - Identify barriers to discharge w/patient and caregiver  - Arrange for needed discharge resources and transportation as appropriate  - Identify discharge learning needs (meds, wound care, etc.)  - Arrange for interpretive services to assist at discharge as needed  - Refer to Case Management Department for coordinating discharge planning if the patient needs post-hospital services based on physician/advanced practitioner order or complex needs  related to functional status, cognitive ability, or social support system  Outcome: Progressing     Problem: Knowledge Deficit  Goal: Patient/family/caregiver demonstrates understanding of disease process, treatment plan, medications, and discharge instructions  Description: Complete learning assessment and assess knowledge base.  Interventions:  - Provide teaching at level of understanding  - Provide teaching via preferred learning methods  Outcome: Progressing     Problem: Nutrition/Hydration-ADULT  Goal: Nutrient/Hydration intake appropriate for improving, restoring or maintaining nutritional needs  Description: Monitor and assess patient's nutrition/hydration status for malnutrition. Collaborate with interdisciplinary team and initiate plan and interventions as ordered.  Monitor patient's weight and dietary intake as ordered or per policy. Utilize nutrition screening tool and intervene as necessary. Determine patient's food preferences and provide high-protein, high-caloric foods as appropriate.     INTERVENTIONS:  - Monitor oral intake, urinary output, labs, and treatment plans  - Assess nutrition and hydration status and recommend course of action  - Evaluate amount of meals eaten  - Assist patient with eating if necessary   - Allow adequate time for meals  - Recommend/ encourage appropriate diets, oral nutritional supplements, and vitamin/mineral supplements  - Order, calculate, and assess calorie counts as needed  - Recommend, monitor, and adjust tube feedings and TPN/PPN based on assessed needs  - Assess need for intravenous fluids  - Provide specific nutrition/hydration education as appropriate  - Include patient/family/caregiver in decisions related to nutrition  Outcome: Progressing     Problem: METABOLIC, FLUID AND ELECTROLYTES - ADULT  Goal: Glucose maintained within target range  Description: INTERVENTIONS:  - Monitor Blood Glucose as ordered  - Assess for signs and symptoms of hyperglycemia and  hypoglycemia  - Administer ordered medications to maintain glucose within target range  - Assess nutritional intake and initiate nutrition service referral as needed  Outcome: Progressing

## 2024-05-31 NOTE — ASSESSMENT & PLAN NOTE
Lab Results   Component Value Date    EGFR 13 05/31/2024    EGFR 12 05/30/2024    CREATININE 4.36 (H) 05/31/2024    CREATININE 4.61 (H) 05/30/2024     Plan:  - Seen by nephrology.  Cleared for discharge from renal perspective.  Progressive disease.  - Follow-up as outpatient

## 2024-05-31 NOTE — DISCHARGE INSTR - AVS FIRST PAGE
Dear Ashok Song,     It was our pleasure to care for you here at [facility].  It is our hope that we were always able to exceed the expected standards for your care during your stay.  You were hospitalized due to high blood sugar.  You were cared for on the 4th floor by Nahed Graham DO under the service of Kailash Mills MD with the [facility] who covers for your primary care physician (PCP), No primary care provider on file., while you were hospitalized.  If you have any questions or concerns related to this hospitalization, you may contact us at .  For follow up as well as any medication refills, we recommend that you follow up with your primary care physician.  A registered nurse will reach out to you by phone within a few days after your discharge to answer any additional questions that you may have after going home.  However, at this time we provide for you here, the most important instructions / recommendations at discharge:     Notable Medication Adjustments -   Start taking Calcitriol 0.25 mcg once per day on Monday, Wednesday, and Friday that was prescribed by your nephrologist outpatient.  Start taking vitamin D3 5000 units daily, this is available over the counter as well.  Increase insulin to NPH 6 units before breakfast.  Increase insulin to NPH 8 units at bedtime.  Increase insulin regular to 4 units before breakfast, lunch and dinner.  Testing Required after Discharge -   None  ** Please contact your PCP to request testing orders for any of the testing recommended here **  Important follow up information -   Follow-up with your primary care physician within 1 week.  Follow-up with endocrinology as outpatient.  Follow-up with nephrology as outpatient.  Other Instructions -   Return to the emergency room if you have chest pain, difficulty breathing, high blood sugars at home, if you are out of insulin, leg swelling,  confusion, increased somnolence, or any other symptoms concerning to you.  Please check your blood sugars at home.  Please review this entire after visit summary as additional general instructions including medication list, appointments, activity, diet, any pertinent wound care, and other additional recommendations from your care team that may be provided for you.      Sincerely,     Nahed Graham, DO

## 2024-05-31 NOTE — PLAN OF CARE
Problem: PAIN - ADULT  Goal: Verbalizes/displays adequate comfort level or baseline comfort level  Description: Interventions:  - Encourage patient to monitor pain and request assistance  - Assess pain using appropriate pain scale  - Administer analgesics based on type and severity of pain and evaluate response  - Implement non-pharmacological measures as appropriate and evaluate response  - Consider cultural and social influences on pain and pain management  - Notify physician/advanced practitioner if interventions unsuccessful or patient reports new pain  Outcome: Progressing     Problem: INFECTION - ADULT  Goal: Absence or prevention of progression during hospitalization  Description: INTERVENTIONS:  - Assess and monitor for signs and symptoms of infection  - Monitor lab/diagnostic results  - Monitor all insertion sites, i.e. indwelling lines, tubes, and drains  - Monitor endotracheal if appropriate and nasal secretions for changes in amount and color  - [place] appropriate cooling/warming therapies per order  - Administer medications as ordered  - Instruct and encourage patient and family to use good hand hygiene technique  - Identify and instruct in appropriate isolation precautions for identified infection/condition  Outcome: Progressing  Goal: Absence of fever/infection during neutropenic period  Description: INTERVENTIONS:  - Monitor WBC    Outcome: Progressing     Problem: SAFETY ADULT  Goal: Patient will remain free of falls  Description: INTERVENTIONS:  - Educate patient/family on patient safety including physical limitations  - Instruct patient to call for assistance with activity   - Consult OT/PT to assist with strengthening/mobility   - Keep Call bell within reach  - Keep bed low and locked with side rails adjusted as appropriate  - Keep care items and personal belongings within reach  - Initiate and maintain comfort rounds  - Make Fall Risk Sign visible to staff  - Offer Toileting every  Hours,  in advance of need  - Initiate/Maintain alarm  - Obtain necessary fall risk management equipment:   - Apply yellow socks and bracelet for high fall risk patients  - Consider moving patient to room near nurses station  Outcome: Progressing  Goal: Maintain or return to baseline ADL function  Description: INTERVENTIONS:  -  Assess patient's ability to carry out ADLs; assess patient's baseline for ADL function and identify physical deficits which impact ability to perform ADLs (bathing, care of mouth/teeth, toileting, grooming, dressing, etc.)  - Assess/evaluate cause of self-care deficits   - Assess range of motion  - Assess patient's mobility; develop plan if impaired  - Assess patient's need for assistive devices and provide as appropriate  - Encourage maximum independence but intervene and supervise when necessary  - Involve family in performance of ADLs  - Assess for home care needs following discharge   - Consider OT consult to assist with ADL evaluation and planning for discharge  - Provide patient education as appropriate  Outcome: Progressing  Goal: Maintains/Returns to pre admission functional level  Description: INTERVENTIONS:  - Perform AM-PAC 6 Click Basic Mobility/ Daily Activity assessment daily.  - Set and communicate daily mobility goal to care team and patient/family/caregiver.   - Collaborate with rehabilitation services on mobility goals if consulted  - Perform Range of Motion  times a day.  - Reposition patient every  hours.  - Dangle patient  times a day  - Stand patient  times a day  - Ambulate patient  times a day  - Out of bed to chair  times a day   - Out of bed for meals  times a day  - Out of bed for toileting  - Record patient progress and toleration of activity level   Outcome: Progressing     Problem: DISCHARGE PLANNING  Goal: Discharge to home or other facility with appropriate resources  Description: INTERVENTIONS:  - Identify barriers to discharge w/patient and caregiver  - Arrange for  needed discharge resources and transportation as appropriate  - Identify discharge learning needs (meds, wound care, etc.)  - Arrange for interpretive services to assist at discharge as needed  - Refer to Case Management Department for coordinating discharge planning if the patient needs post-hospital services based on physician/advanced practitioner order or complex needs related to functional status, cognitive ability, or social support system  Outcome: Progressing     Problem: Knowledge Deficit  Goal: Patient/family/caregiver demonstrates understanding of disease process, treatment plan, medications, and discharge instructions  Description: Complete learning assessment and assess knowledge base.  Interventions:  - Provide teaching at level of understanding  - Provide teaching via preferred learning methods  Outcome: Progressing     Problem: Nutrition/Hydration-ADULT  Goal: Nutrient/Hydration intake appropriate for improving, restoring or maintaining nutritional needs  Description: Monitor and assess patient's nutrition/hydration status for malnutrition. Collaborate with interdisciplinary team and initiate plan and interventions as ordered.  Monitor patient's weight and dietary intake as ordered or per policy. Utilize nutrition screening tool and intervene as necessary. Determine patient's food preferences and provide high-protein, high-caloric foods as appropriate.     INTERVENTIONS:  - Monitor oral intake, urinary output, labs, and treatment plans  - Assess nutrition and hydration status and recommend course of action  - Evaluate amount of meals eaten  - Assist patient with eating if necessary   - Allow adequate time for meals  - Recommend/ encourage appropriate diets, oral nutritional supplements, and vitamin/mineral supplements  - Order, calculate, and assess calorie counts as needed  - Recommend, monitor, and adjust tube feedings and TPN/PPN based on assessed needs  - Assess need for intravenous fluids  -  Provide specific nutrition/hydration education as appropriate  - Include patient/family/caregiver in decisions related to nutrition  Outcome: Progressing     Problem: METABOLIC, FLUID AND ELECTROLYTES - ADULT  Goal: Glucose maintained within target range  Description: INTERVENTIONS:  - Monitor Blood Glucose as ordered  - Assess for signs and symptoms of hyperglycemia and hypoglycemia  - Administer ordered medications to maintain glucose within target range  - Assess nutritional intake and initiate nutrition service referral as needed  Outcome: Progressing

## 2024-05-31 NOTE — CONSULTS
With a medicalNEPHROLOGY HOSPITAL CONSULTATION   Ashok Song 23 y.o. 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   Baseline creatinine: Earlier this year creatinine near 3 more recently near 4 as of hospitalization in April for DKA.  Creatinine 4.6 on current admission with improvement to 4.36 after being treated for severe hyperglycemia.  Given IV fluids therefore likely prerenal component  Also concern for progression of disease in the setting of uncontrolled diabetes.  Long discussion with patient regarding transplant options.  Consult took close to an hour due to language barrier.  Discussed with primary service patient's request for transplant option.  At this time renal function is apparently close to baseline.  Recommendations:  Continue IV fluids, decrease rate to 75 mill per hour  Check labs in the a.m.  Continue current medications  Monitor intake and output  Case management to try to get information for her regarding her transplant options in [place] and [place] in association with her current insurance    Nephrotic range proteinuria:  Etiology: Diabetic nephropathy, biopsy-proven  Not on RAAS blockade due to worsening renal function    Hyponatremia:  Pseudo hyponatremia due to hyperglycemia.  With correction sodium level normal.    Hypertension/volume:  Volume status: Appears fairly euvolemic at this time.  Continue IV fluids since oral intake still appears to be rather poor.  Medications: Currently on amlodipine 5 mg daily    Acid-base disorder:  Low bicarbonate in the setting of ARSALAN.  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  She is chronically on sodium bicarbonate  tablets.  Continue sodium bicarbonate tablets 650 mg twice a day  Bicarbonate level improving    Anemia in the setting of CKD and iron deficiency:  Status post recent iron infusions  Hemoglobin 8.5  Monitor  If transfusion needed administer only leukoreduced blood since patient pursuing transplant option.    CKD-MBD/secondary hyperparathyroidism of renal origin:  On calcitriol 3 times a week which was recently added by Dr. Fermin  Also being treated for vitamin D deficiency  Outpatient monitoring    Diabetes mellitus type 1:  Uncontrolled diabetes  Diagnosed at age 8  Blood sugar 343 on admission.  Improved with treatment     SUMMARY OF RECOMMENDATIONS:  At this time we will decrease rate of IV fluids but continue Plasma-Lyte at 75 mill per hour check labs in the a.m.      Last really    HISTORY OF PRESENT ILLNESS:  Requesting Physician: Kailash Mills MD  Reason for Consult: Severe chronic kidney disease    Ashok Song is a 23 y.o. female with a past medical history of diabetes mellitus type 1 since age 8, prior hospital admission for DKA, CKD, hypertension who was admitted to [facility] after presenting with hyperglycemia, nausea.  According to the patient she was told to come to the hospital by her nephrologist, Dr. Fermin due to elevated blood sugar.  According to the patient she tries to eat well, avoiding carbohydrates and sweets but her blood sugar remains poorly controlled.  She states she is working with her doctor regarding.  She is also concerned because she would like a renal transplant and is unsure how to proceed.  She does not have insurance in [place] but according to her she has insurance and [place].  Many unclear issues due to recent moved to the [place].  At this time the patient is lying in bed quietly her mother is at her side.  They are both Khmer-speaking only.  Interview was conducted through [employer] .  The entire interview was almost 1 hour because patient  was asking many questions about transplant.  She states she is feeling better today.  She does not chronically have nausea but that was only on the day of admission.  At this time she has no complaints.  She is pain-free, no shortness of breath.  No swelling.  Creatinine is at baseline.  She has severe renal insufficiency.  A renal consultation is requested today for assistance in the management of chronic kidney disease.    PAST MEDICAL HISTORY:  Past Medical History:   Diagnosis Date    Diabetes (HCC)        PAST SURGICAL HISTORY:  No past surgical history on file.    ALLERGIES:  No Known Allergies    SOCIAL HISTORY:  Social History     Substance and Sexual Activity   Alcohol Use Not on file     Social History     Substance and Sexual Activity   Drug Use Not on file     Social History     Tobacco Use   Smoking Status Not on file   Smokeless Tobacco Not on file       FAMILY HISTORY:  No family history on file.    MEDICATIONS:    Current Facility-Administered Medications:     acetaminophen (TYLENOL) tablet 975 mg, 975 mg, Oral, Q8H PRN, Yamileth Tony MD, 975 mg at 05/30/24 2227    amLODIPine (NORVASC) tablet 5 mg, 5 mg, Oral, Daily, Shikha Puente MD, 5 mg at 05/31/24 0916    calcitriol (ROCALTROL) capsule 0.25 mcg, 0.25 mcg, Oral, Once per day on Monday Wednesday Friday, Shikha Puente MD, 0.25 mcg at 05/31/24 0917    Cholecalciferol (VITAMIN D3) tablet 5,000 Units, 5,000 Units, Oral, Daily, Shikha Puente MD, 5,000 Units at 05/31/24 0916    ferrous gluconate (FERGON) tablet 324 mg, 324 mg, Oral, Daily With Breakfast, Shikha Puente MD, 324 mg at 05/31/24 0917    heparin (porcine) subcutaneous injection 5,000 Units, 5,000 Units, Subcutaneous, Q8H FLOWER, Shikha Puente MD, 5,000 Units at 05/31/24 0622    insulin lispro (HumALOG/ADMELOG) 100 units/mL subcutaneous injection 1-5 Units, 1-5 Units, Subcutaneous, 4x Daily (AC & HS), 5 Units at 05/30/24 2229 **AND** Fingerstick Glucose (POCT), , , 4x Daily AC and at  bedtime, Yamileth Tony MD    insulin lispro (HumALOG/ADMELOG) 100 units/mL subcutaneous injection 4 Units, 4 Units, Subcutaneous, TID With Meals, Shikha Puente MD, 4 Units at 05/30/24 2111    insulin NPH (HumuLIN N,NovoLIN N) 100 Units/mL subcutaneous injection 6 Units, 6 Units, Subcutaneous, Daily Before Breakfast, Shikha Puente MD    insulin NPH (HumuLIN N,NovoLIN N) 100 Units/mL subcutaneous injection 8 Units, 8 Units, Subcutaneous, HS, Shikha Puente MD, 8 Units at 05/30/24 2228    sodium bicarbonate tablet 650 mg, 650 mg, Oral, BID, Shikha Puente MD, 650 mg at 05/31/24 0916    REVIEW OF SYSTEMS:  Constitutional: Positive for fatigue.  No fevers or chills.  Poor appetite today.  HENT: Negative for postnasal drip  Eyes: Negative for visual disturbance.   Respiratory: Negative for cough, shortness of breath  Cardiovascular: Negative for chest pain, palpitations and leg swelling.   Gastrointestinal: Having periods of nausea prior to admission.  No nausea at this time.  Genitourinary: No dysuria, hematuria  Endocrine: Polyuria prior to admission  Musculoskeletal: Negative for arthralgias, back pain and joint swelling.   Skin: Negative for rash.   Neurological: Negative for focal weakness.  Patient felt lightheaded prior to admission  Hematological: Negative for easy bruising or bleeding.  Psychiatric/Behavioral: Negative for confusion  All the systems were reviewed and were negative except as documented on the HPI.    PHYSICAL EXAM:  Current Weight: Weight - Scale: 61.5 kg (135 lb 9.3 oz)  First Weight: Weight - Scale: 61.5 kg (135 lb 9.3 oz)  Vitals:    05/30/24 1824 05/30/24 1854 05/30/24 2223 05/31/24 0738   BP:  139/89 132/85 122/82   BP Location:   Left arm    Pulse:  72 81 79   Resp:  18 13 14   Temp:  98.4 °F (36.9 °C) 98.4 °F (36.9 °C) 98.4 °F (36.9 °C)   TempSrc:   Oral    SpO2:   100% 99%   Weight: 61.5 kg (135 lb 9.3 oz)          Intake/Output Summary (Last 24 hours) at 5/31/2024 0918  Last data  filed at 5/31/2024 0624  Gross per 24 hour   Intake 2998.33 ml   Output 950 ml   Net 2048.33 ml     Physical Exam  Constitutional:       General: She is not in acute distress.     Appearance: She is well-developed. She is not ill-appearing, toxic-appearing or diaphoretic.   HENT:      Head: Normocephalic and atraumatic.      Nose: Nose normal.      Mouth/Throat:      Mouth: Mucous membranes are dry.   Eyes:      General: No scleral icterus.        Right eye: No discharge.         Left eye: No discharge.      Extraocular Movements: Extraocular movements intact.      Conjunctiva/sclera: Conjunctivae normal.   Neck:      Thyroid: No thyromegaly.      Vascular: No JVD.      Trachea: No tracheal deviation.   Cardiovascular:      Rate and Rhythm: Normal rate and regular rhythm.      Heart sounds: No murmur heard.     No friction rub. No gallop.   Pulmonary:      Effort: Pulmonary effort is normal.      Breath sounds: Normal breath sounds.   Abdominal:      General: Bowel sounds are normal. There is no distension.      Palpations: Abdomen is soft. There is no mass.      Tenderness: There is no abdominal tenderness. There is no guarding or rebound.   Musculoskeletal:         General: No swelling, tenderness or signs of injury. Normal range of motion.      Cervical back: Normal range of motion and neck supple. No rigidity or tenderness.      Right lower leg: No edema.      Left lower leg: No edema.   Skin:     General: Skin is warm and dry.      Capillary Refill: Capillary refill takes less than 2 seconds.      Coloration: Skin is not jaundiced or pale.      Findings: No erythema or rash.   Neurological:      Mental Status: She is alert and oriented to person, place, and time.   Psychiatric:         Mood and Affect: Mood normal.         Behavior: Behavior normal.         Thought Content: Thought content normal.         Judgment: Judgment normal.       Complete    Invasive Devices:      Lab Results:   Results from last 7 days    Lab Units 05/31/24  0543 05/30/24  1541   WBC Thousand/uL 5.11 8.39   HEMOGLOBIN g/dL 8.5* 10.6*   HEMATOCRIT % 24.8* 30.1*   PLATELETS Thousands/uL 188 225   POTASSIUM mmol/L 3.7 4.1   CHLORIDE mmol/L 108 101   CO2 mmol/L 21 18*   BUN mg/dL 54* 59*   CREATININE mg/dL 4.36* 4.61*   CALCIUM mg/dL 8.4 9.2   ALK PHOS U/L  --  143*   ALT U/L  --  16   AST U/L  --  16     Other Studies:

## 2024-05-31 NOTE — DISCHARGE SUMMARY
"[facility]  Discharge- Ashok Song 2000, 23 y.o. female MRN: [id number]  Unit/Bed#: W -01 Encounter: [id number]  Primary Care Provider: No primary care provider on file.   Date and time admitted to hospital: 5/30/2024  3:44 PM    * Hyperglycemia due to type 1 diabetes mellitus (HCC)  Assessment & Plan  - Had outpatient blood glucose level of 576.  Told by nephrologist to come to ED.  - BHB negative, VBG with compensated metabolic acidosis.  No concern for DKA    No results found for: \"HGBA1C\"    Recent Labs     05/30/24  2338 05/31/24  0230 05/31/24  0735 05/31/24  1104   POCGLU 384* 175* 81 115       Blood Sugar Average: Last 72 hrs:  (P) 289.125    - Per last endo note by Dr Martinez on 4/16/24, patient's insulin regimen should be  NPH 6 units qAM   NPH 4 units qPM  Regular insulin 4/3/5 with meals  - It appears the patient is taking:  NPH 4 units qAM   NPH 6 units qPM    Regular insulin 3 units with meals  Plan:  - Discharge patient on current hospital insulin regimen:  NPH 6 units qAM  NPH 8 units qPM  Insulin regular 4 units TID with meals  - Follow up with endocrinology as outpatient    Hypertension  Assessment & Plan  Continue home amlodipine 5 mg    Metabolic acidosis  Assessment & Plan  - Continue home sodium bicarb and 650 mg BID    Anemia due to stage 4 chronic kidney disease  (HCC)  Assessment & Plan  Lab Results   Component Value Date    HGB 8.5 (L) 05/31/2024    HGB 10.6 (L) 05/30/2024     2/2 to hemodilution  Otherwise, stable    Plan:  - Continue home iron tablets 325 mg    Stage 4 chronic kidney disease (HCC)  Assessment & Plan  Lab Results   Component Value Date    EGFR 13 05/31/2024    EGFR 12 05/30/2024    CREATININE 4.36 (H) 05/31/2024    CREATININE 4.61 (H) 05/30/2024     Plan:  - Seen by nephrology.  Cleared for discharge from renal perspective.  Progressive disease.  - Follow-up as outpatient      Medical Problems      Discharging Resident: Nahed" DO Santos  Discharging Attending: Kailash Mills MD  PCP: No primary care provider on file.  Admission Date:   Admission Orders (From admission, onward)       Ordered        05/30/24 1714  Place in Observation  Once                          Discharge Date: 05/31/24    Consultations During Hospital Stay:  Nephrology    Procedures Performed:   None    Significant Findings / Test Results:   Blood sugar 343  Creatinine 4.61    Incidental Findings:   None     Test Results Pending at Discharge (will require follow up):  None     Outpatient Tests Requested:  None    Complications:  None    Reason for Admission: hyperglycemia    Hospital Course:   Ashok Song is a 23 y.o. female patient who originally presented to the hospital on 5/30/2024 due to hyperglycemia with blood glucose level of 576 on outpatient lab work.  She was told by nephrologist to come to the ED.  Upon presentation, her vital signs were stable.  Beta hydroxybutyrate was negative, VBG showed metabolic acidosis with compensation.  No concerns for DKA.  She was hydrated overnight with Isolyte fluids.  Her blood sugars decreased to a normal level with increased insulin coverage.   Insulin regimen was increased to NPH 6 units every morning, NPH 8 units every afternoon, insulin regular 4 units 3 times daily with meals.  Nephrology was consulted for elevated creatinine level of 4.61; stable but progressive disease.  Plan for follow-up with primary care physician within 1 week.  Plan for follow-up with nephrology and endocrinology as outpatient.  Stable for discharge.    Please see above list of diagnoses and related plan for additional information.     Condition at Discharge: stable    Discharge Day Visit / Exam:   Subjective: Patient was seen and examined at bedside.  She endorses fatigue and polyuria.  She denies any abdominal pain, nausea, vomiting, diarrhea.  She has good appetite and is currently eating breakfast at the time of my examination.    Vitals:  Blood Pressure: 122/82 (05/31/24 0738)  Pulse: 79 (05/31/24 0738)  Temperature: 98.4 °F (36.9 °C) (05/31/24 0738)  Temp Source: Oral (05/30/24 2223)  Respirations: 14 (05/31/24 0738)  Weight - Scale: 61.5 kg (135 lb 9.3 oz) (05/30/24 1824)  SpO2: 99 % (05/31/24 0738)  Exam:   Physical Exam  Constitutional:       Appearance: Normal appearance.   HENT:      Head: Normocephalic and atraumatic.   Cardiovascular:      Rate and Rhythm: Normal rate and regular rhythm.      Heart sounds: No murmur heard.  Pulmonary:      Breath sounds: No wheezing, rhonchi or rales.   Abdominal:      General: Bowel sounds are normal. There is no distension.      Palpations: Abdomen is soft.      Tenderness: There is no abdominal tenderness.      Hernia: No hernia is present.   Musculoskeletal:         General: No swelling.      Cervical back: Normal range of motion and neck supple.      Right lower leg: No edema.      Left lower leg: No edema.   Skin:     Capillary Refill: Capillary refill takes less than 2 seconds.      Findings: No erythema.   Neurological:      General: No focal deficit present.      Mental Status: She is alert and oriented to person, place, and time.          Discussion with Family: Updated  (mother) at bedside.    Discharge instructions/Information to patient and family:   See after visit summary for information provided to patient and family.      Provisions for Follow-Up Care:  See after visit summary for information related to follow-up care and any pertinent home health orders.      Mobility at time of Discharge:   Basic Mobility Inpatient Raw Score: 24  JH-HLM Goal: 8: Walk 250 feet or more  JH-HLM Achieved: 8: Walk 250 feet ot more  HLM Goal achieved. Continue to encourage appropriate mobility.     Disposition:   Home    Planned Readmission: No    Discharge Medications:  See after visit summary for reconciled discharge medications provided to patient and/or family.      **Please Note: This note may  have been constructed using a voice recognition system**

## 2024-05-31 NOTE — ASSESSMENT & PLAN NOTE
"- Had outpatient blood glucose level of 576.  Told by nephrologist to come to ED.  - BHB negative, VBG with compensated metabolic acidosis.  No concern for DKA    No results found for: \"HGBA1C\"    Recent Labs     05/30/24  2338 05/31/24  0230 05/31/24  0735 05/31/24  1104   POCGLU 384* 175* 81 115       Blood Sugar Average: Last 72 hrs:  (P) 289.125    - Per last endo note by Dr Martinez on 4/16/24, patient's insulin regimen should be  NPH 6 units qAM   NPH 4 units qPM  Regular insulin 4/3/5 with meals  - It appears the patient is taking:  NPH 4 units qAM   NPH 6 units qPM    Regular insulin 3 units with meals  Plan:  - Discharge patient on current hospital insulin regimen:  NPH 6 units qAM  NPH 8 units qPM  Insulin regular 4 units TID with meals  - Follow up with endocrinology as outpatient  "

## 2024-05-31 NOTE — ASSESSMENT & PLAN NOTE
Lab Results   Component Value Date    HGB 8.5 (L) 05/31/2024    HGB 10.6 (L) 05/30/2024 2/2 to hemodilution  Otherwise, stable    Plan:  - Continue home iron tablets 325 mg